# Patient Record
Sex: FEMALE | Race: WHITE | NOT HISPANIC OR LATINO
[De-identification: names, ages, dates, MRNs, and addresses within clinical notes are randomized per-mention and may not be internally consistent; named-entity substitution may affect disease eponyms.]

---

## 2023-01-31 PROBLEM — Z00.00 ENCOUNTER FOR PREVENTIVE HEALTH EXAMINATION: Status: ACTIVE | Noted: 2023-01-31

## 2023-02-02 ENCOUNTER — APPOINTMENT (OUTPATIENT)
Dept: UROLOGY | Facility: CLINIC | Age: 33
End: 2023-02-02
Payer: COMMERCIAL

## 2023-02-02 VITALS
TEMPERATURE: 98.7 F | DIASTOLIC BLOOD PRESSURE: 69 MMHG | BODY MASS INDEX: 21.18 KG/M2 | SYSTOLIC BLOOD PRESSURE: 105 MMHG | WEIGHT: 143 LBS | OXYGEN SATURATION: 99 % | HEIGHT: 69 IN | HEART RATE: 72 BPM

## 2023-02-02 DIAGNOSIS — Z78.9 OTHER SPECIFIED HEALTH STATUS: ICD-10-CM

## 2023-02-02 DIAGNOSIS — Z87.442 PERSONAL HISTORY OF URINARY CALCULI: ICD-10-CM

## 2023-02-02 PROCEDURE — 99204 OFFICE O/P NEW MOD 45 MIN: CPT

## 2023-02-02 NOTE — LETTER BODY
[Dear  ___] : Dear  [unfilled], [Consult Letter:] : I had the pleasure of evaluating your patient, [unfilled]. [Please see my note below.] : Please see my note below. [Consult Closing:] : Thank you very much for allowing me to participate in the care of this patient.  If you have any questions, please do not hesitate to contact me. [FreeTextEntry3] : Best Regards, \par \par Shala Sanchez MD\par

## 2023-02-02 NOTE — PHYSICAL EXAM
[General Appearance - Well Developed] : well developed [General Appearance - Well Nourished] : well nourished [Normal Appearance] : normal appearance [Well Groomed] : well groomed [General Appearance - In No Acute Distress] : no acute distress [Urinary Bladder Findings] : the bladder was normal on palpation [Oriented To Time, Place, And Person] : oriented to person, place, and time [Affect] : the affect was normal [Mood] : the mood was normal [Not Anxious] : not anxious [Edema] : no peripheral edema [Respiration, Rhythm And Depth] : normal respiratory rhythm and effort [Exaggerated Use Of Accessory Muscles For Inspiration] : no accessory muscle use [Abdomen Soft] : soft [Abdomen Tenderness] : non-tender [Costovertebral Angle Tenderness] : no ~M costovertebral angle tenderness [Urethral Meatus] : normal urethra [External Female Genitalia] : normal external genitalia [Vagina] : normal vaginal exam [Cervix] : normal cervix [Normal Station and Gait] : the gait and station were normal for the patient's age [] : no rash [No Focal Deficits] : no focal deficits

## 2023-02-02 NOTE — ASSESSMENT
[FreeTextEntry1] : We discussed the findings of her recent CT urogram. We also discussed the potential causes of gross hematuria including, UTI, kidney stones, and tumors. I reviewed the hematuria evaluation with her including CT abdomen and pelvis, and in-office cystoscopy. She will return for the in-office cystoscopy and repeat UA in between menses. C+S and cytology also sent.\par \par \par As for the microhematuria, she is on the last day of her menstrual period. Shala Sanchez MD\par

## 2023-02-02 NOTE — HISTORY OF PRESENT ILLNESS
[FreeTextEntry1] : 33 YO F seen TODAY 2023 as NPT for hematuria. A few months ago, she had a UTI. She intermittently had episodes of gross hematuria. She had a CT urogram done 2 weeks ago as advised by Dr. Feldman. 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 She is currently on the last day of her menstrual cycle.  , no medication, NKMA although she developed itchy throat, and fool soles, but no rash. \par \par Patient passed a stone by history about 10 years ago. S/P LEEP procedure .

## 2023-02-06 ENCOUNTER — NON-APPOINTMENT (OUTPATIENT)
Age: 33
End: 2023-02-06

## 2023-02-06 DIAGNOSIS — N39.0 URINARY TRACT INFECTION, SITE NOT SPECIFIED: ICD-10-CM

## 2023-02-06 LAB
BACTERIA UR CULT: ABNORMAL
URINE CYTOLOGY: NORMAL

## 2023-02-06 RX ORDER — NITROFURANTOIN (MONOHYDRATE/MACROCRYSTALS) 25; 75 MG/1; MG/1
100 CAPSULE ORAL
Qty: 10 | Refills: 0 | Status: ACTIVE | COMMUNITY
Start: 2023-02-06 | End: 1900-01-01

## 2023-02-17 ENCOUNTER — APPOINTMENT (OUTPATIENT)
Dept: UROLOGY | Facility: CLINIC | Age: 33
End: 2023-02-17
Payer: COMMERCIAL

## 2023-02-17 ENCOUNTER — NON-APPOINTMENT (OUTPATIENT)
Age: 33
End: 2023-02-17

## 2023-02-17 VITALS
TEMPERATURE: 98 F | DIASTOLIC BLOOD PRESSURE: 71 MMHG | HEART RATE: 66 BPM | SYSTOLIC BLOOD PRESSURE: 111 MMHG | OXYGEN SATURATION: 100 %

## 2023-02-17 LAB
BACTERIA UR CULT: NORMAL
BILIRUB UR QL STRIP: NORMAL
CLARITY UR: CLEAR
COLLECTION METHOD: NORMAL
GLUCOSE UR-MCNC: NORMAL
HCG UR QL: 0.2 EU/DL
HGB UR QL STRIP.AUTO: NORMAL
KETONES UR-MCNC: NORMAL
LEUKOCYTE ESTERASE UR QL STRIP: NORMAL
NITRITE UR QL STRIP: NORMAL
PH UR STRIP: 5
PROT UR STRIP-MCNC: NORMAL
SP GR UR STRIP: 1.03

## 2023-02-17 PROCEDURE — 81003 URINALYSIS AUTO W/O SCOPE: CPT | Mod: QW

## 2023-02-17 PROCEDURE — 52000 CYSTOURETHROSCOPY: CPT

## 2023-02-17 NOTE — LETTER BODY
[Dear  ___] : Dear  [unfilled], [Courtesy Letter:] : I had the pleasure of seeing your patient, [unfilled], in my office today. [Please see my note below.] : Please see my note below. [Consult Closing:] : Thank you very much for allowing me to participate in the care of this patient.  If you have any questions, please do not hesitate to contact me. [FreeTextEntry3] : Best Regards, \par \par Shala Sanchez MD\par

## 2023-02-17 NOTE — ASSESSMENT
[FreeTextEntry1] : No apparent cause for the gross hematuria at present. No further diagnostic testing needed at thia time, FU 3 months or if she again sees gross hematuria. Shala Sanchez MD\par

## 2023-02-17 NOTE — HISTORY OF PRESENT ILLNESS
[FreeTextEntry1] : 31 YO F seen 2023 as NPT for hematuria. A few months ago, she had a UTI. She intermittently had episodes of gross hematuria. She had a CT urogram done 2 weeks ago as advised by Dr. Feldman. 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 She is currently on the last day of her menstrual cycle.  , no medication, NKMA although she developed itchy throat, and fool soles, but no rash.\par C+S 50- 99 K  E. Coli S to all. treated with Macrobid.\par Cytology negative\par \par Patient seen TODAY 2023 for cystoscopy. She has not had any interval gross hematuria. I reviewed images from the CT scan confirming the left duplex system with no hydronephrosis, masses or stones. Cystoscopy performed today sowed mild narrowing of the urethra with no intrinsic bladder lesions, bilateral single UOs with clear efflux from both, tolerated well.\par UA negative.\par \par Patient passed a stone by history about 10 years ago. S/P LEEP procedure .

## 2023-02-21 ENCOUNTER — NON-APPOINTMENT (OUTPATIENT)
Age: 33
End: 2023-02-21

## 2023-02-21 LAB — BACTERIA UR CULT: NORMAL

## 2023-05-10 ENCOUNTER — APPOINTMENT (OUTPATIENT)
Dept: UROLOGY | Facility: CLINIC | Age: 33
End: 2023-05-10
Payer: COMMERCIAL

## 2023-05-10 VITALS
TEMPERATURE: 97.9 F | HEART RATE: 63 BPM | DIASTOLIC BLOOD PRESSURE: 66 MMHG | SYSTOLIC BLOOD PRESSURE: 101 MMHG | OXYGEN SATURATION: 93 %

## 2023-05-10 DIAGNOSIS — R31.9 HEMATURIA, UNSPECIFIED: ICD-10-CM

## 2023-05-10 PROCEDURE — 99213 OFFICE O/P EST LOW 20 MIN: CPT

## 2023-05-10 NOTE — ASSESSMENT
[FreeTextEntry1] : Patient remains asymptomatic.  She will drop off a urine specimen in a week or 2 for repeat UA culture cytology.  If these are all negative, then follow-up 1 year. Shala Sanchez MD\par

## 2023-05-10 NOTE — HISTORY OF PRESENT ILLNESS
[FreeTextEntry1] : 31 YO F seen 2023 as NPT for hematuria. A few months ago, she had a UTI. She intermittently had episodes of gross hematuria. She had a CT urogram done 2 weeks ago as advised by Dr. Feldman. 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 She is currently on the last day of her menstrual cycle.  , no medication, NKMA although she developed itchy throat, and fool soles, but no rash.\par C+S 50- 99 K  E. Coli S to all. treated with Macrobid.\par Cytology negative\par \par Patient seen TODAY 2023 for cystoscopy. She has not had any interval gross hematuria. I reviewed images from the CT scan confirming the left duplex system with no hydronephrosis, masses or stones. Cystoscopy performed today sowed mild narrowing of the urethra with no intrinsic bladder lesions, bilateral single UOs with clear efflux from both, tolerated well.\par UA negative.\par C+S negative\par \par Patient seen TODAY 5/10/2023 to reassess. She has been asymptomatic and denies any gross hematuria. She just started her menstrual period today, so no urine specimen eas given.\par \par Patient passed a stone by history about 10 years ago. S/P LEEP procedure .

## 2023-05-10 NOTE — LETTER BODY
[Dear  ___] : Dear  [unfilled], [Courtesy Letter:] : I had the pleasure of seeing your patient, [unfilled], in my office today. [Please see my note below.] : Please see my note below. [Consult Closing:] : Thank you very much for allowing me to participate in the care of this patient.  If you have any questions, please do not hesitate to contact me. [FreeTextEntry3] : Best Regards, \par \par Shaal Sanchez MD\par

## 2023-10-17 LAB
APPEARANCE: CLEAR
BACTERIA UR CULT: NORMAL
BACTERIA: NEGATIVE /HPF
BILIRUBIN URINE: NEGATIVE
BLOOD URINE: NEGATIVE
CAST: 0 /LPF
COLOR: YELLOW
EPITHELIAL CELLS: 1 /HPF
GLUCOSE QUALITATIVE U: NEGATIVE MG/DL
KETONES URINE: NEGATIVE MG/DL
LEUKOCYTE ESTERASE URINE: NEGATIVE
MICROSCOPIC-UA: NORMAL
NITRITE URINE: NEGATIVE
PH URINE: 5.5
PROTEIN URINE: NEGATIVE MG/DL
RED BLOOD CELLS URINE: 1 /HPF
SPECIFIC GRAVITY URINE: 1.02
URINE CYTOLOGY: NORMAL
UROBILINOGEN URINE: 0.2 MG/DL
WHITE BLOOD CELLS URINE: 1 /HPF

## 2024-03-07 ENCOUNTER — NON-APPOINTMENT (OUTPATIENT)
Age: 34
End: 2024-03-07